# Patient Record
Sex: MALE | Race: WHITE | NOT HISPANIC OR LATINO | Employment: OTHER | URBAN - METROPOLITAN AREA
[De-identification: names, ages, dates, MRNs, and addresses within clinical notes are randomized per-mention and may not be internally consistent; named-entity substitution may affect disease eponyms.]

---

## 2019-08-26 ENCOUNTER — TELEPHONE (OUTPATIENT)
Dept: TRANSPLANT | Facility: CLINIC | Age: 84
End: 2019-08-26

## 2019-08-26 NOTE — TELEPHONE ENCOUNTER
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 Follows Dr. Espinal now at [facility] (Dr. Mcallister retired). Mr. Adkins was not aware that Vyndaqel was approved by FDA, therefore is interested in starting. Offered to schedule an appt with Dr. Melton or Ligia, but has trips planned in the near future and would like to schedule something when her returns. My number has been offered to him if he has any further questions or ready to schedule an appt.

## 2019-10-03 ENCOUNTER — TELEPHONE (OUTPATIENT)
Dept: TRANSPLANT | Facility: CLINIC | Age: 84
End: 2019-10-03

## 2019-10-03 NOTE — TELEPHONE ENCOUNTER
Contacted . Jarrod Adkins to inform him that a new pharmaceutical agent was available for wild-type amyloidosis, Vyndaqel approved by FDA in April 2019. Asked him if he was interested in initiating treatment; he stated that he was unsure at this time but that he would discuss it with both of his sons who are physicians. Offered to send him literature on Vyndaqel which he gladly accepted. Such literature was therefore mailed to his home and my information provided if he decided to seek treatment.

## 2020-02-28 ENCOUNTER — TELEPHONE (OUTPATIENT)
Dept: TRANSPLANT | Facility: CLINIC | Age: 85
End: 2020-02-28

## 2020-02-28 NOTE — TELEPHONE ENCOUNTER
Received a phone call from Mr. Jarrod Adkins indicating that he was ready to begin Tafamidis for amyloidosis. He was referred to Dr. Melton 5 years by a physician at [facility] where he was diagnosed with wild type amyloidosis. No therapy or clinical trial was available at that time so he returned to the care of his cardiologist at [facility] until therapy would be available. I did notify Mr. Adkins early this summer that Tafamidis was approved by the FDA but was not interested at the time to begin treatment. Now, his physician at , Dr. Espinal has recommended that he seek therapy with Dr. Melton. Notified Heart Failure nurses, Dr. Melton & Dr. Strong of the referral.

## 2020-12-10 ENCOUNTER — OFFICE VISIT (OUTPATIENT)
Dept: PULMONOLOGY | Facility: CLINIC | Age: 85
End: 2020-12-10
Payer: MEDICARE

## 2020-12-10 ENCOUNTER — HOSPITAL ENCOUNTER (OUTPATIENT)
Dept: PULMONOLOGY | Facility: CLINIC | Age: 85
Discharge: HOME | End: 2020-12-10
Payer: MEDICARE

## 2020-12-10 VITALS
HEART RATE: 86 BPM | SYSTOLIC BLOOD PRESSURE: 132 MMHG | DIASTOLIC BLOOD PRESSURE: 70 MMHG | HEIGHT: 69 IN | BODY MASS INDEX: 35.2 KG/M2 | WEIGHT: 237.63 LBS | OXYGEN SATURATION: 86 %

## 2020-12-10 VITALS — WEIGHT: 237 LBS | BODY MASS INDEX: 35.92 KG/M2 | HEIGHT: 68 IN

## 2020-12-10 DIAGNOSIS — J84.9 INTERSTITIAL PULMONARY DISEASE, UNSPECIFIED: ICD-10-CM

## 2020-12-10 DIAGNOSIS — J98.4 RESTRICTIVE LUNG DISEASE: ICD-10-CM

## 2020-12-10 DIAGNOSIS — J96.11 CHRONIC RESPIRATORY FAILURE WITH HYPOXIA: ICD-10-CM

## 2020-12-10 PROCEDURE — 99204 OFFICE O/P NEW MOD 45 MIN: CPT | Mod: S$PBB,,, | Performed by: INTERNAL MEDICINE

## 2020-12-10 PROCEDURE — 99214 OFFICE O/P EST MOD 30 MIN: CPT | Mod: PBBFAC,25 | Performed by: INTERNAL MEDICINE

## 2020-12-10 PROCEDURE — 94618 PULMONARY STRESS TESTING: ICD-10-PCS | Mod: 26,S$PBB,, | Performed by: INTERNAL MEDICINE

## 2020-12-10 PROCEDURE — 99204 PR OFFICE/OUTPT VISIT, NEW, LEVL IV, 45-59 MIN: ICD-10-PCS | Mod: S$PBB,,, | Performed by: INTERNAL MEDICINE

## 2020-12-10 PROCEDURE — 99999 PR PBB SHADOW E&M-EST. PATIENT-LVL IV: ICD-10-PCS | Mod: PBBFAC,,, | Performed by: INTERNAL MEDICINE

## 2020-12-10 PROCEDURE — 94618 PULMONARY STRESS TESTING: CPT | Mod: PBBFAC | Performed by: INTERNAL MEDICINE

## 2020-12-10 PROCEDURE — 94618 PULMONARY STRESS TESTING: CPT | Mod: 26,S$PBB,, | Performed by: INTERNAL MEDICINE

## 2020-12-10 PROCEDURE — 99999 PR PBB SHADOW E&M-EST. PATIENT-LVL IV: CPT | Mod: PBBFAC,,, | Performed by: INTERNAL MEDICINE

## 2020-12-10 NOTE — LETTER
December 10, 2020      Gerald Cadet MD  [address]  [facility] [address]           [facility] [address]  [address]  [facility] [address]  Phone: [phone]          Patient: Jarrod Adkins Jr.   MR Number: [id number]   YOB: 1935   Date of Visit: 12/10/2020       Dear Dr. Gerald Cadet:    Thank you for referring Jarrod Adkins to me for evaluation. Attached you will find relevant portions of my assessment and plan of care.    If you have questions, please do not hesitate to call me. I look forward to following Jarrod Adkins along with you.    Sincerely,    Heidi Talamantes MD    Enclosure  CC:  No Recipients    If you would like to receive this communication electronically, please contact [email or web address] or [phone] to request more information on [employer] Link access.    For providers and/or their staff who would like to refer a patient to [facility], please contact us through our one-stop-shop provider referral line, [facility] [place], at [phone].    If you feel you have received this communication in error or would no longer like to receive these types of communications, please e-mail [email or web address]

## 2020-12-10 NOTE — ASSESSMENT & PLAN NOTE
Desaturation with ambulation in my office to 88%. Patient reports O2 sat of 81% at home with exertion.   -Oxygen qualifying walk ordered.

## 2020-12-10 NOTE — ASSESSMENT & PLAN NOTE
Patient with moderate restriction and moderate decrease in DLCO on PFTs. I suspect that patient's PFT abnormalities may be associated with his heart disease-patient is currently hypoxic with lower extremity edema.   However, patient is on Amiodarone since September and was on Amiodarone prior to 2015. Patient reported no symptoms prior to this recent hospitalization.   -BNP  -CT chest without contrast

## 2020-12-10 NOTE — PROGRESS NOTES
Subjective:       Patient ID: Jarrod Adkins Jr. is a 85 y.o. male.    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 [facility] He reports dyspnea on exertion since his discharge. Patient states he has minimal symptoms at rest and weren't present a few weeks prior to hospitalization. Patient was started on Amiodarone during his hospitalization. Amiodarone decreased from 200 mg PO BID --> 200mg Qday---> 100mg QDAY(current dose).   Documented to have CHF with depressed EF.   Patient is a never smoker.   Currently on Lasix.     Review of Systems   Constitutional: Positive for fatigue. Negative for activity change and appetite change.   Respiratory: Positive for cough, shortness of breath and dyspnea on extertion. Negative for sputum production and wheezing.    Cardiovascular: Positive for leg swelling.   Musculoskeletal: Negative for arthralgias, back pain and joint swelling.   Skin: Negative for rash.   Gastrointestinal: Negative for abdominal pain and abdominal distention.   Psychiatric/Behavioral: Negative for confusion. The patient is not nervous/anxious.        Past Medical History:   Diagnosis Date    Amyloidosis     CHF (congestive heart failure)     Diabetes mellitus, type 2     Encounter for blood transfusion     Hypertension     Obesity      Past Surgical History:   Procedure Laterality Date    ADENOIDECTOMY      APPENDECTOMY      CARDIAC SURGERY      CARDIAC VALVE REPLACEMENT      EYE SURGERY      HERNIA REPAIR      TONSILLECTOMY       Social History     Socioeconomic History    Marital status:      Spouse name: Not on file    Number of children: Not on file    Years of education: Not on file    Highest education level: Not on file   Occupational History    Not on file   Social Needs    Financial resource strain: Not on file    Food insecurity      Worry: Not on file     Inability: Not on file    Transportation needs     Medical: Not on file     Non-medical: Not on file   Tobacco Use    Smoking status: Passive Smoke Exposure - Never Smoker   Substance and Sexual Activity    Alcohol use: No     Alcohol/week: 0.0 standard drinks    Drug use: No    Sexual activity: Not on file   Lifestyle    Physical activity     Days per week: Not on file     Minutes per session: Not on file    Stress: Not on file   Relationships    Social connections     Talks on phone: Not on file     Gets together: Not on file     Attends Anabaptist service: Not on file     Active member of club or organization: Not on file     Attends meetings of clubs or organizations: Not on file     Relationship status: Not on file   Other Topics Concern    Not on file   Social History Narrative    Not on file     Family History   Problem Relation Age of Onset    Cancer Mother     Early death Mother     Heart disease Father        Objective:      Physical Exam   Constitutional: He appears well-developed and well-nourished.   HENT:   Head: Normocephalic.   Nose: Nose normal.   Neck: Normal range of motion. Neck supple.   Cardiovascular: Normal rate and regular rhythm.   Pulmonary/Chest: Normal expansion and effort normal.   Abdominal: Soft. Bowel sounds are normal.   Musculoskeletal:         General: Edema present.   Skin: Skin is warm and dry.   Psychiatric: He has a normal mood and affect. His behavior is normal.   Nursing note and vitals reviewed.    Personal Diagnostic Review  Pulmonary function tests: FEV1: 1.54  (63 % predicted), FVC:  1.96 (56 % predicted), FEV1/FVC:  79, TLC: 3.93 (59 % predicted), DLCO: 11.02 (65 % predicted)  Pulmonary Function Tests 12/10/2020   SpO2 86   Height 68.5   Weight 3802.49   BMI (Calculated) 35.6   Some recent data might be hidden         Assessment:       No diagnosis found.    Outpatient Encounter Medications as of 12/10/2020   Medication Sig  "Dispense Refill    amiodarone (PACERONE) 200 MG Tab 200 mg once daily.       aspirin (ECOTRIN) 81 MG EC tablet Take 81 mg by mouth once daily.      atorvastatin (LIPITOR) 80 MG tablet 80 mg once daily.       BD INSULIN PEN NEEDLE UF SHORT 31 X 5/16 " Ndle       benazepril (LOTENSIN) 40 MG tablet       colchicine 0.6 mg tablet       digoxin (LANOXIN) 125 mcg tablet       docusate sodium (COLACE) 100 MG capsule Take 100 mg by mouth 2 (two) times daily.      doxazosin (CARDURA) 4 MG tablet every 12 (twelve) hours.       finasteride (PROSCAR) 5 mg tablet 5 mg once daily.       furosemide (LASIX) 40 MG tablet 40 mg once daily.       LANTUS SOLOSTAR 100 unit/mL (3 mL) InPn pen 40 Units once daily.       magnesium oxide (MAG-OX) 400 mg tablet Take 400 mg by mouth once daily.      metolazone (ZAROXOLYN) 5 MG tablet       metoprolol succinate (TOPROL-XL) 50 MG 24 hr tablet 50 mg 2 (two) times daily.       metoprolol tartrate (LOPRESSOR) 50 MG tablet       mupirocin (BACTROBAN) 2 % ointment   0    ONGLYZA 5 mg Tab tablet 5 mg once daily.       oxycodone (ROXICODONE) 5 MG immediate release tablet       PRADAXA 150 mg Cap       sotalol (BETAPACE) 80 MG tablet       spironolactone (ALDACTONE) 50 MG tablet 50 mg. Take 1/2 tablet on Mon and Fri      warfarin (COUMADIN) 5 MG tablet 5 mg. Take 5 mg on Mon and 1/2 tablet on other days      ZETIA 10 mg tablet 10 mg once daily.       No facility-administered encounter medications on file as of 12/10/2020.      No orders of the defined types were placed in this encounter.      Plan:          Restrictive lung disease  Patient with moderate restriction and moderate decrease in DLCO on PFTs. I suspect that patient's PFT abnormalities may be associated with his heart disease-patient is currently hypoxic with lower extremity edema.   However, patient is on Amiodarone since September and was on Amiodarone prior to 2015. Patient reported no symptoms prior to this recent " hospitalization.   -BNP  -CT chest without contrast     Chronic respiratory failure with hypoxia  Desaturation with ambulation in my office to 88%. Patient reports O2 sat of 81% at home with exertion.   -Oxygen qualifying walk ordered.     Follow up after tests performed.     Heidi Talamantes MD

## 2020-12-11 NOTE — PROCEDURES
Jarrod Adkins Jr. is a 85 y.o.  male patient, who presents for a 6 minute walk test ordered by MD Albin.  The diagnosis is Shortness of Breath; Oxygen Qualification.  The patient's BMI is 36 kg/m2. Predicted distance (lower limit of normal) is 209.02 meters.    Test Results:    The test was not completed.  The patient stopped 1 time for a total of 150 seconds.  The total time walked was 210 seconds.  During walking, the patient reported:  Dyspnea.  The patient used a wheelchair for assistance during testing.     12/10/2020---------Distance: 121.92 meters (400 feet)     O2 Sat % Supplemental Oxygen Heart Rate Blood Pressure Jason Scale   Pre-exercise  (Resting) 95 % Room Air 76 bpm 133/75 mmHg 2   During Exercise 85 % Room Air 92 bpm 142/69 mmHg 5-6   Post-exercise   94 % Room Air  86 bpm       Recovery Time: 75 seconds    Oxygen Qualification:     O2 Sat % Supplemental Oxygen Heart Rate Blood Pressure Jason Scale   Pre-exercise  (Resting) 99 % 2 L/M  83 bpm  142/67 mmHg 2    During Exercise   99 %  2 L/M  82 bpm  140/69 mmHg 1    Post-exercise   99 %  2 L/M  80 bpm        Recovery Time: 45 seconds    Performing nurse/tech: Estopinal RRT      PREVIOUS STUDY:   The patient has not had a previous study.      CLINICAL INTERPRETATION:  Six minute walk distance is 121.92 meters (400 feet) with heavy dyspnea.  During exercise, there was significant desaturation while breathing room air.  Both blood pressure and heart rate remained stable with walking.  The patient did not report non-pulmonary symptoms during exercise.  Severe exercise impairment is likely due to desaturation and subjective symptoms.  The patient did not complete the study, walking 210 seconds of the 360 second test.  The patient may benefit from using supplemental oxygen during exertion.  No previous study performed.  Based upon age and body mass index, exercise capacity is less than predicted.   Oxygen saturation did improve while breathing  supplemental oxygen.

## 2020-12-13 ENCOUNTER — HOSPITAL ENCOUNTER (OUTPATIENT)
Dept: RADIOLOGY | Facility: HOSPITAL | Age: 85
Discharge: HOME | End: 2020-12-13
Attending: INTERNAL MEDICINE
Payer: MEDICARE

## 2020-12-13 DIAGNOSIS — J84.9 INTERSTITIAL PULMONARY DISEASE, UNSPECIFIED: ICD-10-CM

## 2020-12-13 PROCEDURE — 71250 CT THORAX DX C-: CPT | Mod: 26,,, | Performed by: RADIOLOGY

## 2020-12-13 PROCEDURE — 71250 CT THORAX DX C-: CPT | Mod: TC

## 2020-12-13 PROCEDURE — 71250 CT CHEST WITHOUT CONTRAST: ICD-10-PCS | Mod: 26,,, | Performed by: RADIOLOGY

## 2020-12-14 DIAGNOSIS — J96.11 CHRONIC RESPIRATORY FAILURE WITH HYPOXIA: Primary | ICD-10-CM

## 2020-12-15 ENCOUNTER — TELEPHONE (OUTPATIENT)
Dept: PULMONOLOGY | Facility: CLINIC | Age: 85
End: 2020-12-15

## 2020-12-15 DIAGNOSIS — J44.9 CHRONIC OBSTRUCTIVE PULMONARY DISEASE, UNSPECIFIED COPD TYPE: Primary | ICD-10-CM

## 2020-12-15 NOTE — TELEPHONE ENCOUNTER
----- Message from Myrna King sent at 12/15/2020 11:42 AM CST -----  Pt calling for his test results from his ct scan and would like an update on his oxygen orders please call back to discuss      Please najma /daughter [phone]

## 2020-12-15 NOTE — TELEPHONE ENCOUNTER
I spoke to patient's daughter, Brooklyn. I let her know an oxygen order was faxed over to [facility] yesterday afternoon. I will follow up with [facility] to see when patient's oxygen will be delivered. Radha with [facility] did receive order.     I will send a message to Dr Talamantes for ct scan results.    Brooklyn mentioned they have an appointment at Mr Adkins's cardiologist today and will need a copy of the labs and ct scan for the appointment. I let her know to call back with a fax number and I will get it sent over. She stated when she arrives at the appointment she will call me back with a fax number. Brooklyn verbalized understanding.

## 2020-12-23 ENCOUNTER — TELEPHONE (OUTPATIENT)
Dept: PULMONOLOGY | Facility: CLINIC | Age: 85
End: 2020-12-23

## 2020-12-23 NOTE — TELEPHONE ENCOUNTER
Spoke with patient  in regards to being scheduled with  in January 2021.  I informed patient MD Mary schedule isn't open for January 2021. But I will forward this message to  in [facility] to scheduling  for a follow up visit.  Patient verbalized he understands.

## 2020-12-23 NOTE — TELEPHONE ENCOUNTER
----- Message from Daniel Virk sent at 12/23/2020  4:38 PM CST -----  Contact: Pt @ [phone]  Pt calling in regards to schedule a follow up appt, attempted to schedule wasn't able    Pt @ [phone]

## 2021-01-04 DIAGNOSIS — I42.1 HOCM (HYPERTROPHIC OBSTRUCTIVE CARDIOMYOPATHY): Primary | ICD-10-CM

## 2021-02-01 ENCOUNTER — HOSPITAL ENCOUNTER (OUTPATIENT)
Dept: CARDIOLOGY | Facility: CLINIC | Age: 86
Discharge: HOME | End: 2021-02-01
Attending: INTERNAL MEDICINE
Payer: MEDICARE

## 2021-02-01 ENCOUNTER — OFFICE VISIT (OUTPATIENT)
Dept: TRANSPLANT | Facility: CLINIC | Age: 86
End: 2021-02-01
Attending: INTERNAL MEDICINE
Payer: MEDICARE

## 2021-02-01 VITALS
WEIGHT: 221.13 LBS | HEART RATE: 72 BPM | HEIGHT: 69 IN | DIASTOLIC BLOOD PRESSURE: 68 MMHG | BODY MASS INDEX: 32.75 KG/M2 | SYSTOLIC BLOOD PRESSURE: 132 MMHG

## 2021-02-01 DIAGNOSIS — I48.0 PAF (PAROXYSMAL ATRIAL FIBRILLATION): ICD-10-CM

## 2021-02-01 DIAGNOSIS — I42.1 HOCM (HYPERTROPHIC OBSTRUCTIVE CARDIOMYOPATHY): ICD-10-CM

## 2021-02-01 DIAGNOSIS — J96.11 CHRONIC RESPIRATORY FAILURE WITH HYPOXIA: ICD-10-CM

## 2021-02-01 DIAGNOSIS — J98.4 RESTRICTIVE LUNG DISEASE: ICD-10-CM

## 2021-02-01 DIAGNOSIS — I43 CARDIAC AMYLOIDOSIS: Primary | ICD-10-CM

## 2021-02-01 DIAGNOSIS — I25.10 CORONARY ARTERY DISEASE INVOLVING NATIVE CORONARY ARTERY OF NATIVE HEART WITHOUT ANGINA PECTORIS: ICD-10-CM

## 2021-02-01 DIAGNOSIS — E78.5 HYPERLIPIDEMIA ASSOCIATED WITH TYPE 2 DIABETES MELLITUS: ICD-10-CM

## 2021-02-01 DIAGNOSIS — E85.89: ICD-10-CM

## 2021-02-01 DIAGNOSIS — E11.69 HYPERLIPIDEMIA ASSOCIATED WITH TYPE 2 DIABETES MELLITUS: ICD-10-CM

## 2021-02-01 DIAGNOSIS — E85.4 CARDIAC AMYLOIDOSIS: Primary | ICD-10-CM

## 2021-02-01 PROCEDURE — 93010 EKG 12-LEAD: ICD-10-PCS | Mod: S$PBB,,, | Performed by: INTERNAL MEDICINE

## 2021-02-01 PROCEDURE — 99205 OFFICE O/P NEW HI 60 MIN: CPT | Mod: S$PBB,,, | Performed by: INTERNAL MEDICINE

## 2021-02-01 PROCEDURE — 99999 PR PBB SHADOW E&M-EST. PATIENT-LVL III: ICD-10-PCS | Mod: PBBFAC,,, | Performed by: INTERNAL MEDICINE

## 2021-02-01 PROCEDURE — 99205 PR OFFICE/OUTPT VISIT, NEW, LEVL V, 60-74 MIN: ICD-10-PCS | Mod: S$PBB,,, | Performed by: INTERNAL MEDICINE

## 2021-02-01 PROCEDURE — 93010 ELECTROCARDIOGRAM REPORT: CPT | Mod: S$PBB,,, | Performed by: INTERNAL MEDICINE

## 2021-02-01 PROCEDURE — 93005 ELECTROCARDIOGRAM TRACING: CPT | Mod: PBBFAC | Performed by: INTERNAL MEDICINE

## 2021-02-01 PROCEDURE — 99213 OFFICE O/P EST LOW 20 MIN: CPT | Mod: PBBFAC,25 | Performed by: INTERNAL MEDICINE

## 2021-02-01 PROCEDURE — 99999 PR PBB SHADOW E&M-EST. PATIENT-LVL III: CPT | Mod: PBBFAC,,, | Performed by: INTERNAL MEDICINE

## 2021-02-01 RX ORDER — TORSEMIDE 100 MG/1
100 TABLET ORAL DAILY
COMMUNITY
Start: 2020-12-15 | End: 2021-02-01 | Stop reason: SDUPTHER

## 2021-02-01 RX ORDER — POTASSIUM CHLORIDE 20 MEQ/1
20 TABLET, EXTENDED RELEASE ORAL 2 TIMES DAILY
Qty: 60 TABLET | Refills: 5
Start: 2021-02-01

## 2021-02-01 RX ORDER — FINASTERIDE 5 MG/1
1 TABLET, FILM COATED ORAL DAILY
COMMUNITY
Start: 2020-12-30

## 2021-02-01 RX ORDER — LOSARTAN POTASSIUM 25 MG/1
1 TABLET ORAL DAILY
COMMUNITY
Start: 2021-01-14

## 2021-02-01 RX ORDER — POTASSIUM CHLORIDE 20 MEQ/1
1 TABLET, EXTENDED RELEASE ORAL 2 TIMES DAILY
COMMUNITY
Start: 2021-01-14 | End: 2021-02-01 | Stop reason: SDUPTHER

## 2021-02-01 RX ORDER — TORSEMIDE 100 MG/1
100 TABLET ORAL DAILY
Qty: 30 TABLET | Refills: 5
Start: 2021-02-01 | End: 2021-03-08 | Stop reason: SDUPTHER

## 2021-02-04 ENCOUNTER — TELEPHONE (OUTPATIENT)
Dept: CARDIOLOGY | Facility: CLINIC | Age: 86
End: 2021-02-04

## 2021-02-08 ENCOUNTER — HOSPITAL ENCOUNTER (OUTPATIENT)
Dept: CARDIOLOGY | Facility: HOSPITAL | Age: 86
Discharge: HOME | End: 2021-02-08
Attending: INTERNAL MEDICINE
Payer: MEDICARE

## 2021-02-08 DIAGNOSIS — E85.89: ICD-10-CM

## 2021-02-08 DIAGNOSIS — I42.1 HOCM (HYPERTROPHIC OBSTRUCTIVE CARDIOMYOPATHY): ICD-10-CM

## 2021-02-08 LAB — OHS CV PLANAR SCORE: 2

## 2021-02-08 PROCEDURE — 78803 RP LOCLZJ TUM SPECT 1 AREA: CPT | Mod: 26,,, | Performed by: INTERNAL MEDICINE

## 2021-02-08 PROCEDURE — 78803 CV PYP ATTR W SPECT (CUPID ONLY): ICD-10-PCS | Mod: 26,,, | Performed by: INTERNAL MEDICINE

## 2021-02-08 PROCEDURE — 78803 RP LOCLZJ TUM SPECT 1 AREA: CPT

## 2021-02-11 ENCOUNTER — TELEPHONE (OUTPATIENT)
Dept: TRANSPLANT | Facility: CLINIC | Age: 86
End: 2021-02-11

## 2021-02-14 PROBLEM — E11.69 HYPERLIPIDEMIA ASSOCIATED WITH TYPE 2 DIABETES MELLITUS: Status: ACTIVE | Noted: 2021-02-14

## 2021-02-14 PROBLEM — I25.10 CORONARY ARTERY DISEASE INVOLVING NATIVE CORONARY ARTERY OF NATIVE HEART WITHOUT ANGINA PECTORIS: Status: ACTIVE | Noted: 2021-02-14

## 2021-02-14 PROBLEM — E78.5 HYPERLIPIDEMIA ASSOCIATED WITH TYPE 2 DIABETES MELLITUS: Status: ACTIVE | Noted: 2021-02-14

## 2021-03-08 ENCOUNTER — OFFICE VISIT (OUTPATIENT)
Dept: TRANSPLANT | Facility: CLINIC | Age: 86
End: 2021-03-08
Attending: INTERNAL MEDICINE
Payer: MEDICARE

## 2021-03-08 ENCOUNTER — LAB VISIT (OUTPATIENT)
Dept: LAB | Facility: HOSPITAL | Age: 86
End: 2021-03-08
Attending: INTERNAL MEDICINE
Payer: MEDICARE

## 2021-03-08 ENCOUNTER — DOCUMENTATION ONLY (OUTPATIENT)
Dept: TRANSPLANT | Facility: CLINIC | Age: 86
End: 2021-03-08

## 2021-03-08 VITALS
BODY MASS INDEX: 32.26 KG/M2 | SYSTOLIC BLOOD PRESSURE: 134 MMHG | HEIGHT: 69 IN | DIASTOLIC BLOOD PRESSURE: 75 MMHG | WEIGHT: 217.81 LBS | HEART RATE: 72 BPM

## 2021-03-08 DIAGNOSIS — E85.89: Primary | ICD-10-CM

## 2021-03-08 DIAGNOSIS — N18.32 STAGE 3B CHRONIC KIDNEY DISEASE: ICD-10-CM

## 2021-03-08 DIAGNOSIS — E85.4 CARDIAC AMYLOIDOSIS: ICD-10-CM

## 2021-03-08 DIAGNOSIS — I48.0 PAF (PAROXYSMAL ATRIAL FIBRILLATION): ICD-10-CM

## 2021-03-08 DIAGNOSIS — Z98.890 HISTORY OF VENTRICULAR SEPTAL MYECTOMY: ICD-10-CM

## 2021-03-08 DIAGNOSIS — I43 CARDIAC AMYLOIDOSIS: Primary | ICD-10-CM

## 2021-03-08 DIAGNOSIS — I43 CARDIAC AMYLOIDOSIS: ICD-10-CM

## 2021-03-08 DIAGNOSIS — I42.1 HOCM (HYPERTROPHIC OBSTRUCTIVE CARDIOMYOPATHY): ICD-10-CM

## 2021-03-08 DIAGNOSIS — E85.4 CARDIAC AMYLOIDOSIS: Primary | ICD-10-CM

## 2021-03-08 LAB
ANION GAP SERPL CALC-SCNC: 9 MMOL/L (ref 8–16)
BUN SERPL-MCNC: 29 MG/DL (ref 8–23)
CALCIUM SERPL-MCNC: 8.9 MG/DL (ref 8.7–10.5)
CHLORIDE SERPL-SCNC: 101 MMOL/L (ref 95–110)
CO2 SERPL-SCNC: 29 MMOL/L (ref 23–29)
CREAT SERPL-MCNC: 1.9 MG/DL (ref 0.5–1.4)
EST. GFR  (AFRICAN AMERICAN): 36.9 ML/MIN/1.73 M^2
EST. GFR  (NON AFRICAN AMERICAN): 31.9 ML/MIN/1.73 M^2
GLUCOSE SERPL-MCNC: 183 MG/DL (ref 70–110)
POTASSIUM SERPL-SCNC: 4.7 MMOL/L (ref 3.5–5.1)
SODIUM SERPL-SCNC: 139 MMOL/L (ref 136–145)

## 2021-03-08 PROCEDURE — 99214 OFFICE O/P EST MOD 30 MIN: CPT | Mod: S$PBB,,, | Performed by: INTERNAL MEDICINE

## 2021-03-08 PROCEDURE — 36415 COLL VENOUS BLD VENIPUNCTURE: CPT | Performed by: INTERNAL MEDICINE

## 2021-03-08 PROCEDURE — 99999 PR PBB SHADOW E&M-EST. PATIENT-LVL IV: ICD-10-PCS | Mod: PBBFAC,,, | Performed by: INTERNAL MEDICINE

## 2021-03-08 PROCEDURE — 99214 OFFICE O/P EST MOD 30 MIN: CPT | Mod: PBBFAC | Performed by: INTERNAL MEDICINE

## 2021-03-08 PROCEDURE — 99214 PR OFFICE/OUTPT VISIT, EST, LEVL IV, 30-39 MIN: ICD-10-PCS | Mod: S$PBB,,, | Performed by: INTERNAL MEDICINE

## 2021-03-08 PROCEDURE — 84484 ASSAY OF TROPONIN QUANT: CPT | Performed by: INTERNAL MEDICINE

## 2021-03-08 PROCEDURE — 80048 BASIC METABOLIC PNL TOTAL CA: CPT | Performed by: INTERNAL MEDICINE

## 2021-03-08 PROCEDURE — 99999 PR PBB SHADOW E&M-EST. PATIENT-LVL IV: CPT | Mod: PBBFAC,,, | Performed by: INTERNAL MEDICINE

## 2021-03-08 PROCEDURE — 83880 ASSAY OF NATRIURETIC PEPTIDE: CPT | Performed by: INTERNAL MEDICINE

## 2021-03-08 RX ORDER — TORSEMIDE 100 MG/1
100 TABLET ORAL DAILY
Qty: 30 TABLET | Refills: 5
Start: 2021-03-08

## 2021-03-09 LAB
NT-PROBNP SERPL-MCNC: 2778 PG/ML
TROPONIN T SERPL-MCNC: 111 NG/L

## 2021-03-25 LAB
MISCELLANEOUS TEST NAME: NORMAL
REFERENCE LAB: NORMAL
SPECIMEN TYPE: NORMAL
TEST RESULT: NORMAL

## 2021-04-15 ENCOUNTER — PATIENT MESSAGE (OUTPATIENT)
Dept: RESEARCH | Facility: HOSPITAL | Age: 86
End: 2021-04-15

## 2021-04-15 ENCOUNTER — TELEPHONE (OUTPATIENT)
Dept: TRANSPLANT | Facility: CLINIC | Age: 86
End: 2021-04-15

## 2021-07-06 ENCOUNTER — TELEPHONE (OUTPATIENT)
Dept: TRANSPLANT | Facility: CLINIC | Age: 86
End: 2021-07-06

## 2021-07-18 ENCOUNTER — TELEPHONE (OUTPATIENT)
Dept: TRANSPLANT | Facility: HOSPITAL | Age: 86
End: 2021-07-18